# Patient Record
(demographics unavailable — no encounter records)

---

## 2025-05-22 NOTE — REASON FOR VISIT
[Initial Evaluation] : an initial evaluation [DM Type 2] : DM Type 2 [Other: _____] : [unfilled] [Source: ______] : History obtained from [facility]

## 2025-05-31 NOTE — PHYSICAL EXAM
[Alert] : alert [Healthy Appearance] : healthy appearance [No Acute Distress] : no acute distress [Well Developed] : well developed [Normal Voice/Communication] : normal voice communication [Normal Sclera/Conjunctiva] : normal sclera/conjunctiva [EOMI] : extra ocular movement intact [No Proptosis] : no proptosis [Normal Hearing] : hearing was normal [Thyroid Not Enlarged] : the thyroid was not enlarged [No Respiratory Distress] : no respiratory distress [Normal Rate and Effort] : normal respiratory rate and effort [Normal Rate] : heart rate was normal [No Edema] : no peripheral edema [Not Distended] : not distended [Spine Straight] : spine straight [No Stigmata of Cushings Syndrome] : no stigmata of Cushings Syndrome [No Involuntary Movements] : no involuntary movements were seen [Normal Strength/Tone] : muscle strength and tone were normal [No Rash] : no rash [Oriented x3] : oriented to person, place, and time [Normal Affect] : the affect was normal [Recent Memory Normal] : recent memory was not impaired [Normal Insight/Judgement] : insight and judgment were intact [Normal Mood] : the mood was normal [Remote Memory Normal] : remote memory was not impaired [de-identified] : Ambulating with cane assistance.  [de-identified] : No observed focal neurological deficits.

## 2025-05-31 NOTE — PHYSICAL EXAM
[Alert] : alert [Healthy Appearance] : healthy appearance [No Acute Distress] : no acute distress [Well Developed] : well developed [Normal Voice/Communication] : normal voice communication [Normal Sclera/Conjunctiva] : normal sclera/conjunctiva [EOMI] : extra ocular movement intact [No Proptosis] : no proptosis [Normal Hearing] : hearing was normal [Thyroid Not Enlarged] : the thyroid was not enlarged [No Respiratory Distress] : no respiratory distress [Normal Rate and Effort] : normal respiratory rate and effort [Normal Rate] : heart rate was normal [No Edema] : no peripheral edema [Not Distended] : not distended [Spine Straight] : spine straight [No Stigmata of Cushings Syndrome] : no stigmata of Cushings Syndrome [No Involuntary Movements] : no involuntary movements were seen [Normal Strength/Tone] : muscle strength and tone were normal [No Rash] : no rash [Oriented x3] : oriented to person, place, and time [Normal Affect] : the affect was normal [Recent Memory Normal] : recent memory was not impaired [Normal Insight/Judgement] : insight and judgment were intact [Normal Mood] : the mood was normal [Remote Memory Normal] : remote memory was not impaired [de-identified] : Ambulating with cane assistance.  [de-identified] : No observed focal neurological deficits.

## 2025-05-31 NOTE — HISTORY OF PRESENT ILLNESS
[FreeTextEntry1] : DM2 diagnosed many years back, complicated with CAD. Taking Lantus 20 units daily, Janumet  mg twice daily, glipizide 5 mg daily,  Compliance: good Tolerating: yes Therapies tried: Lantus, glipizide, Invokana (genital infections), Januvia, metformin Last HgA1c in April 2025 was 6.4%,  Checks BG daily each morning, typically 70-90 mg/dL. Hypoglycemia: Has awareness. Denies severe nor frequent episodes. Polyuria, polydipsia, unintentional weight loss: Denies   Diabetic emergencies/admissions: Denies   Microvascular complications Last albumin/creatinine ratio: unknown Neuropathy symptoms: Mentions symptoms suggestive of foot drop. Microfilament exam:  Diabetic retinopathy: Denies Last dilated eye exam: Annually.   Macrovascular complications CAD/CVA/PVD: PAD?   HTN: Yes, not at goal with multidrug regimen, including Entresto. LDL:  77 mg/dL while taking ezetimibe 10 mg daily. Goal is < 70 mg/dL.   Diet: Vegetarian. Exercise: Walking  Immunizations: Family DM History: Mother, sister. Early MI in father.

## 2025-05-31 NOTE — PHYSICAL EXAM
[Alert] : alert [Healthy Appearance] : healthy appearance [No Acute Distress] : no acute distress [Well Developed] : well developed [Normal Voice/Communication] : normal voice communication [Normal Sclera/Conjunctiva] : normal sclera/conjunctiva [EOMI] : extra ocular movement intact [No Proptosis] : no proptosis [Normal Hearing] : hearing was normal [Thyroid Not Enlarged] : the thyroid was not enlarged [No Respiratory Distress] : no respiratory distress [Normal Rate and Effort] : normal respiratory rate and effort [Normal Rate] : heart rate was normal [No Edema] : no peripheral edema [Not Distended] : not distended [Spine Straight] : spine straight [No Stigmata of Cushings Syndrome] : no stigmata of Cushings Syndrome [No Involuntary Movements] : no involuntary movements were seen [Normal Strength/Tone] : muscle strength and tone were normal [No Rash] : no rash [Oriented x3] : oriented to person, place, and time [Normal Affect] : the affect was normal [Recent Memory Normal] : recent memory was not impaired [Normal Insight/Judgement] : insight and judgment were intact [Normal Mood] : the mood was normal [Remote Memory Normal] : remote memory was not impaired [de-identified] : Ambulating with cane assistance.  [de-identified] : No observed focal neurological deficits.

## 2025-06-24 NOTE — BEGINNING OF VISIT
[0] : 2) Feeling down, depressed, or hopeless: Not at all (0) [PHQ-2 Negative] : PHQ-2 Negative [Never] : Never [Date Discussed (MM/DD/YY): ___] : Discussed: [unfilled] [Patient/Caregiver unclear of wishes] : Patient/Caregiver unclear of wishes [Abdominal Pain] : no abdominal pain [Vomiting] : no vomiting [Constipation] : no constipation

## 2025-06-25 NOTE — ASSESSMENT
[FreeTextEntry1] : #Normocytic anemia  work up with IgG Kappa Monoclonal gammopathy-> Likely reactive and not a true plasma cell disorder. Repeat testing multiple times showed normal SPEP< IFx so no further work up needed He never had a colonoscopy  Status post IV Injectafer x 2 in Feb 2024 s/p 4 cardiac stents on 4/2025 and another 4 on 6/13/2025 He went into [facility] 6/18/2025 for syncope and found to have Hgb 8.7 6/20/2025 with cardiologist Hgb 8.4 Ferritin 139 Now with fatigue and very little lightheaded denies any bleeding -Labs are drawn in the office, reviewed, analyzed, and discussed Hgb 8.6 and Ferritin 229 Explained to patient and wife that his low Hgb 2/2 to bleeding from cardiac stents procedure and given his gradual increase in Hgb and normal Ferritin, no IV iron or transfusion needed at this time.  He can follow up with his PCP from now and follow up with us prn. Advised to see GI for colonoscopy  Elevated homocysteine Normal methylmalonic acid Advised to take folic acid 1 mg daily forever He wants to also take B12.  Explained that I have no reservation and he can take both folic acid and B12  Patient had multiple questions which were answered to satisfaction  d/w Dr. Sarabia  Follow-up as needed CBC, CMP, Ferritin, iron level, type and screen

## 2025-06-25 NOTE — HISTORY OF PRESENT ILLNESS
[de-identified] : Mr. Judd Magdaleno is 77 years old male with  gammopathy here for consultation, referred by Dr. Milena Ovalle Accompanied by his wife   Patient with type 2 insulin dependent diabetes, HNT, hypothyroid, and hypercholesterolemia who started to have right drop foot six years ago and gait instability and balance issues in the two years, followed by neurologist Dr. Ovalle and diagnosed with polyneuropathy.   11/30/2023 IFx - low level IgG Kappa IgG - 1493 H/H - 13.4/40.6 MCV 80.1 Ca 10.5 BUN/Cr - 16.8/1.2 GFR 62  FHx: Two sisters with breast cancer (60s, 50s)  Social History Smoking -denies Alcohol -denies Illicit drugs -denies Lives with his wife Vegetarian   Vaccination: Flu 11/2023 Covid 12/2023 pneumonia - 2022 Health Care Proxy - Jolly Magdaleno (daughter)  He is leaving for [facility] in two days and will be back at end of May 2024.    [de-identified] : Patient is seen today for follow up s/p injectafer x 2 in Feb 2024  Patient with fatigue and lightheaded He went into [facility] 6/18/2025 for syncope and found to have Hgb 8.7 6/20/2025 with cardiologist Hgb 8.4 Ferritin 139 s/p 4 cardiac stents on 4/2025 and another 4 on 6/13/2025 He has been on iron tablet in the last three days never had a colonoscopy

## 2025-06-25 NOTE — ASSESSMENT
[FreeTextEntry1] : #Normocytic anemia  work up with IgG Kappa Monoclonal gammopathy-> Likely reactive and not a true plasma cell disorder. Repeat testing multiple times showed normal SPEP< IFx so no further work up needed He never had a colonoscopy  Status post IV Injectafer x 2 in Feb 2024 s/p 4 cardiac stents on 4/2025 and another 4 on 6/13/2025 He went into [facility] 6/18/2025 for syncope and found to have Hgb 8.7 6/20/2025 with cardiologist Hgb 8.4 Ferritin 139 Now with fatigue and very little lightheaded denies any bleeding -Labs are drawn in the office, reviewed, analyzed, and discussed Hgb 8.6 and Ferritin 229 Explained to patient and wife that his low Hgb 2/2 to bleeding from cardiac stents procedure and given his gradual increase in Hgb and normal Ferritin, no IV iron or transfusion needed at this time.  He can follow up with his PCP from now and follow up with us prn. Advised to see GI for colonoscopy  Elevated homocysteine Normal methylmalonic acid Advised to take folic acid 1 mg daily forever He wants to also take B12.  Explained that I have no reservation and he can take both folic acid and B12  Patient had multiple questions which were answered to satisfaction  d/w Dr. Sarabia  Follow-up as needed CBC, CMP, Ferritin, iron level, type and screen

## 2025-06-25 NOTE — REVIEW OF SYSTEMS
[Negative] : Allergic/Immunologic [Fatigue] : fatigue [de-identified] : neuropathy on legs, walks with cane

## 2025-06-25 NOTE — HISTORY OF PRESENT ILLNESS
[de-identified] : Mr. uJdd Magdaleno is 77 years old male with  gammopathy here for consultation, referred by Dr. Milena Ovalle Accompanied by his wife   Patient with type 2 insulin dependent diabetes, HNT, hypothyroid, and hypercholesterolemia who started to have right drop foot six years ago and gait instability and balance issues in the two years, followed by neurologist Dr. Ovalle and diagnosed with polyneuropathy.   11/30/2023 IFx - low level IgG Kappa IgG - 1493 H/H - 13.4/40.6 MCV 80.1 Ca 10.5 BUN/Cr - 16.8/1.2 GFR 62  FHx: Two sisters with breast cancer (60s, 50s)  Social History Smoking -denies Alcohol -denies Illicit drugs -denies Lives with his wife Vegetarian   Vaccination: Flu 11/2023 Covid 12/2023 pneumonia - 2022 Health Care Proxy - Jolly Magdaleno (daughter)  He is leaving for [facility] in two days and will be back at end of May 2024.    [de-identified] : Patient is seen today for follow up s/p injectafer x 2 in Feb 2024  Patient with fatigue and lightheaded He went into [facility] 6/18/2025 for syncope and found to have Hgb 8.7 6/20/2025 with cardiologist Hgb 8.4 Ferritin 139 s/p 4 cardiac stents on 4/2025 and another 4 on 6/13/2025 He has been on iron tablet in the last three days never had a colonoscopy

## 2025-06-25 NOTE — PHYSICAL EXAM
[Capable of only limited self care, confined to bed or chair more than 50% of waking hours] : Status 3- Capable of only limited self care, confined to bed or chair more than 50% of waking hours [Thin] : thin [Normal] : normal appearance, no rash, nodules, vesicles, ulcers, erythema [de-identified] : Anxious [de-identified] : Walking with cane. UNstead on his feet without cane

## 2025-06-25 NOTE — PHYSICAL EXAM
[Capable of only limited self care, confined to bed or chair more than 50% of waking hours] : Status 3- Capable of only limited self care, confined to bed or chair more than 50% of waking hours [Thin] : thin [Normal] : normal appearance, no rash, nodules, vesicles, ulcers, erythema [de-identified] : Walking with cane. UNstead on his feet without cane [de-identified] : Anxious

## 2025-06-25 NOTE — CONSULT LETTER
[Dear  ___] : Dear  [unfilled], [Consult Letter:] : I had the pleasure of evaluating your patient, [unfilled]. [Please see my note below.] : Please see my note below. [Consult Closing:] : Thank you very much for allowing me to participate in the care of this patient.  If you have any questions, please do not hesitate to contact me. [Sincerely,] : Sincerely, [FreeTextEntry3] : Braulio Sarabia MD, MPH  of Medicine [facility] at [facility] Attending Physician  Hematology and Medical Oncology [facility]

## 2025-06-25 NOTE — CONSULT LETTER
[Dear  ___] : Dear  [unfilled], [Consult Letter:] : I had the pleasure of evaluating your patient, [unfilled]. [Please see my note below.] : Please see my note below. [Consult Closing:] : Thank you very much for allowing me to participate in the care of this patient.  If you have any questions, please do not hesitate to contact me. [Sincerely,] : Sincerely, [FreeTextEntry3] : Braulio Sarabia MD, MPH  of Medicine [facility] at [facility] Attending Physician  Hematology and Medical Oncology [facility]

## 2025-06-25 NOTE — CONSULT LETTER
[Dear  ___] : Dear  [unfilled], [Consult Letter:] : I had the pleasure of evaluating your patient, [unfilled]. [Please see my note below.] : Please see my note below. [Consult Closing:] : Thank you very much for allowing me to participate in the care of this patient.  If you have any questions, please do not hesitate to contact me. [Sincerely,] : Sincerely, [FreeTextEntry3] : Braulio Sarabia MD, MPH  of Medicine [facility] at [facility] Attending Physician  Hematology and Medical Oncology [facility]

## 2025-06-25 NOTE — REVIEW OF SYSTEMS
[Negative] : Allergic/Immunologic [Fatigue] : fatigue [de-identified] : neuropathy on legs, walks with cane

## 2025-06-25 NOTE — REVIEW OF SYSTEMS
[Negative] : Allergic/Immunologic [Fatigue] : fatigue [de-identified] : neuropathy on legs, walks with cane

## 2025-06-25 NOTE — PHYSICAL EXAM
[Capable of only limited self care, confined to bed or chair more than 50% of waking hours] : Status 3- Capable of only limited self care, confined to bed or chair more than 50% of waking hours [Thin] : thin [Normal] : normal appearance, no rash, nodules, vesicles, ulcers, erythema [de-identified] : Walking with cane. UNstead on his feet without cane [de-identified] : Anxious

## 2025-06-25 NOTE — HISTORY OF PRESENT ILLNESS
[de-identified] : Mr. Judd Magdaleno is 77 years old male with  gammopathy here for consultation, referred by Dr. Milena Ovalle Accompanied by his wife   Patient with type 2 insulin dependent diabetes, HNT, hypothyroid, and hypercholesterolemia who started to have right drop foot six years ago and gait instability and balance issues in the two years, followed by neurologist Dr. Ovalle and diagnosed with polyneuropathy.   11/30/2023 IFx - low level IgG Kappa IgG - 1493 H/H - 13.4/40.6 MCV 80.1 Ca 10.5 BUN/Cr - 16.8/1.2 GFR 62  FHx: Two sisters with breast cancer (60s, 50s)  Social History Smoking -denies Alcohol -denies Illicit drugs -denies Lives with his wife Vegetarian   Vaccination: Flu 11/2023 Covid 12/2023 pneumonia - 2022 Health Care Proxy - Jolly Magdaleno (daughter)  He is leaving for [facility] in two days and will be back at end of May 2024.    [de-identified] : Patient is seen today for follow up s/p injectafer x 2 in Feb 2024  Patient with fatigue and lightheaded He went into [facility] 6/18/2025 for syncope and found to have Hgb 8.7 6/20/2025 with cardiologist Hgb 8.4 Ferritin 139 s/p 4 cardiac stents on 4/2025 and another 4 on 6/13/2025 He has been on iron tablet in the last three days never had a colonoscopy

## 2025-06-25 NOTE — ASSESSMENT
[FreeTextEntry1] : #Normocytic anemia  work up with IgG Kappa Monoclonal gammopathy-> Likely reactive and not a true plasma cell disorder. Repeat testing multiple times showed normal SPEP< IFx so no further work up needed He never had a colonoscopy  Status post IV Injectafer x 2 in Feb 2024 s/p 4 cardiac stents on 4/2025 and another 4 on 6/13/2025 He went into [facility] 6/18/2025 for syncope and found to have Hgb 8.7 6/20/2025 with cardiologist Hgb 8.4 Ferritin 139 Now with fatigue and very little lightheaded denies any bleeding -Labs are drawn in the office, reviewed, analyzed, and discussed Hgb 8.6 and Ferritin 229 Explained to patient and wife that his low Hgb 2/2 to bleeding from cardiac stents procedure and given his gradual increase in Hgb and normal Ferritin, no IV iron or transfusion needed at this time.  He can follow up with his PCP from now and follow up with us prn. Advised to see GI for colonoscopy  Elevated homocysteine Normal methylmalonic acid Advised to take folic acid 1 mg daily forever He wants to also take B12.  Explained that I have no reservation and he can take both folic acid and B12  Patient had multiple questions which were answered to satisfaction  d/w Dr. Sarabia  Follow-up as needed CBC, CMP, Ferritin, iron level, type and screen